# Patient Record
(demographics unavailable — no encounter records)

---

## 2025-04-21 NOTE — HISTORY OF PRESENT ILLNESS
[de-identified] : 39-year-old female is here today for evaluation of her left great toe.  Patient states she was going up the stairs and she tripped and stubbed her great toe.  Since then she been having pain.  She went to the urgent care and had x-rays taken and was told she had a fracture.  She been wearing a Darco shoe.  She is here today for repeat evaluation.  Denies any pain to the rest of the foot.  She denies any numbness or tingling.

## 2025-04-21 NOTE — IMAGING
[de-identified] : On examination of her left foot she has mild swelling and ecchymosis of the great toe.  She is tender over the distal phalanx.  No tenderness over the proximal phalanx.  No tenderness over the first MTP joint.  No tenderness over the metatarsals.  No tenderness over the Lisfranc joint.  She is able to flex and extend the toe but has some decreased range of motion.  Sensation is intact throughout, 2+ DP and PT pulses.  X-rays taken in the urgent care reviewed in the office today show nondisplaced fracture of the distal phalanx of the great toe.  No other fractures or dislocations noted.

## 2025-04-21 NOTE — DISCUSSION/SUMMARY
[de-identified] : At this time I ann taped the toes.  I discussed with her she can continue to wear the Darco shoe or whatever shoe she is comfortable in.  I will see her back in a few weeks for repeat x-rays and evaluation. Patient will call me if any other problems or concerns.  Patient verbalized understanding and agreed with the plan, all questions were answered in the office today.

## 2025-05-15 NOTE — DISCUSSION/SUMMARY
[de-identified] : At this time she can continue to weight-bear as tolerated and whatever shoe she is comfortable in.  She is going to work on range of motion of her toe.  I will see her back in 4 weeks for repeat x-rays and evaluation.  Patient will call me if any other problems or concerns.  Patient verbalized understanding and agreed with the plan, all questions were answered in the office today.

## 2025-05-15 NOTE — IMAGING
[de-identified] : On examination of her left foot she has no erythema, no swelling, no ecchymosis of the great toe.  Mild tenderness over the distal phalanx.  No tenderness over the proximal phalanx.  No tenderness over the first MTP joint.  No tenderness over the metatarsals.  No tenderness over the Lisfranc joint.  She is able to flex and extend the toe but has some decreased range of motion.  Sensation is intact throughout, 2+ DP and PT pulses.  X-rays repeated in the office today of the left great toe show well-healing nondisplaced distal phalanx fracture.  Alignment is unchanged, there is some callus formation.

## 2025-05-15 NOTE — HISTORY OF PRESENT ILLNESS
[de-identified] : 39-year-old female is here today for follow-up of her left great toe fracture.  She is now about 3 weeks out.  She is feeling better but still has some pain.  She denies any new injury or trauma.  She denies any numbness tingling or any calf pain.